# Patient Record
Sex: MALE | Race: OTHER
[De-identification: names, ages, dates, MRNs, and addresses within clinical notes are randomized per-mention and may not be internally consistent; named-entity substitution may affect disease eponyms.]

---

## 2019-09-13 ENCOUNTER — HOSPITAL ENCOUNTER (EMERGENCY)
Dept: HOSPITAL 1 - ED | Age: 79
Discharge: HOME | End: 2019-09-13
Payer: COMMERCIAL

## 2019-09-13 VITALS
BODY MASS INDEX: 28.32 KG/M2 | HEIGHT: 65 IN | HEIGHT: 65 IN | WEIGHT: 170 LBS | BODY MASS INDEX: 28.32 KG/M2 | WEIGHT: 170 LBS

## 2019-09-13 VITALS — DIASTOLIC BLOOD PRESSURE: 47 MMHG | SYSTOLIC BLOOD PRESSURE: 119 MMHG

## 2019-09-13 DIAGNOSIS — D72.829: ICD-10-CM

## 2019-09-13 DIAGNOSIS — D64.9: Primary | ICD-10-CM

## 2019-09-13 DIAGNOSIS — R63.4: ICD-10-CM

## 2019-09-13 DIAGNOSIS — E88.09: ICD-10-CM

## 2019-09-13 LAB
ALBUMIN SERPL-MCNC: 1.6 G/DL (ref 3.4–5)
ALP SERPL-CCNC: 235 U/L (ref 46–116)
ALT SERPL-CCNC: 63 U/L (ref 16–63)
AMPHETAMINES UR QL SCN: (no result)
AST SERPL-CCNC: 53 U/L (ref 15–37)
BASOPHILS NFR BLD: 0.3 % (ref 0–2)
BILIRUB SERPL-MCNC: 0.9 MG/DL (ref 0.2–1)
BUN SERPL-MCNC: 13 MG/DL (ref 7–18)
CALCIUM SERPL-MCNC: 8 MG/DL (ref 8.5–10.1)
CHLORIDE SERPL-SCNC: 101 MMOL/L (ref 98–107)
CHOLEST SERPL-MCNC: 73 MG/DL (ref ?–200)
CO2 SERPL-SCNC: 26.2 MMOL/L (ref 21–32)
CREAT SERPL-MCNC: 0.9 MG/DL (ref 0.7–1.3)
ERYTHROCYTE [DISTWIDTH] IN BLOOD BY AUTOMATED COUNT: 21.3 % (ref 11.5–14.5)
GFR SERPLBLD BASED ON 1.73 SQ M-ARVRAT: (no result) ML/MIN
GLUCOSE SERPL-MCNC: 92 MG/DL (ref 74–106)
HDLC SERPL-MCNC: 26 MG/DL (ref 40–60)
LIPASE SERPL-CCNC: 211 IU/L (ref 73–393)
MAGNESIUM SERPL-MCNC: 2.2 MG/DL (ref 1.8–2.4)
MICROSCOPIC UR-IMP: YES
PLATELET # BLD: 373 X10^3MCL (ref 130–400)
POTASSIUM SERPL-SCNC: 3.4 MMOL/L (ref 3.5–5.1)
PROT SERPL-MCNC: 7.4 G/DL (ref 6.4–8.2)
RBC # UR STRIP.AUTO: NEGATIVE /UL
RBC MORPH BLD: (no result)
SODIUM SERPL-SCNC: 135 MMOL/L (ref 136–145)
T4 SERPL-MCNC: 5 UG/DL (ref 4.7–13.3)
UA SPECIFIC GRAVITY: 1.02 (ref 1–1.03)

## 2019-09-13 PROCEDURE — G0480 DRUG TEST DEF 1-7 CLASSES: HCPCS

## 2019-10-01 ENCOUNTER — HOSPITAL ENCOUNTER (INPATIENT)
Dept: HOSPITAL 1 - ED | Age: 79
LOS: 3 days | Discharge: NURSING, REHAB OR LONG-TERM CARE | DRG: 391 | End: 2019-10-04
Attending: INTERNAL MEDICINE | Admitting: INTERNAL MEDICINE
Payer: COMMERCIAL

## 2019-10-01 VITALS
BODY MASS INDEX: 23.67 KG/M2 | BODY MASS INDEX: 23.67 KG/M2 | HEIGHT: 68 IN | WEIGHT: 156.19 LBS | HEIGHT: 68 IN | WEIGHT: 156.19 LBS

## 2019-10-01 VITALS — DIASTOLIC BLOOD PRESSURE: 43 MMHG | SYSTOLIC BLOOD PRESSURE: 117 MMHG

## 2019-10-01 VITALS — DIASTOLIC BLOOD PRESSURE: 51 MMHG | SYSTOLIC BLOOD PRESSURE: 120 MMHG

## 2019-10-01 DIAGNOSIS — E43: ICD-10-CM

## 2019-10-01 DIAGNOSIS — R19.03: Primary | ICD-10-CM

## 2019-10-01 DIAGNOSIS — R59.0: ICD-10-CM

## 2019-10-01 DIAGNOSIS — D72.829: ICD-10-CM

## 2019-10-01 DIAGNOSIS — F03.90: ICD-10-CM

## 2019-10-01 LAB
ALBUMIN SERPL-MCNC: 1.4 G/DL (ref 3.4–5)
ALP SERPL-CCNC: 228 U/L (ref 46–116)
ALT SERPL-CCNC: 47 U/L (ref 16–63)
AST SERPL-CCNC: 40 U/L (ref 15–37)
BASOPHILS NFR BLD: 0 % (ref 0–2)
BILIRUB SERPL-MCNC: 1 MG/DL (ref 0.2–1)
BUN SERPL-MCNC: 13 MG/DL (ref 7–18)
CALCIUM SERPL-MCNC: 8.2 MG/DL (ref 8.5–10.1)
CHLORIDE SERPL-SCNC: 98 MMOL/L (ref 98–107)
CO2 SERPL-SCNC: 27.4 MMOL/L (ref 21–32)
CREAT SERPL-MCNC: 1.1 MG/DL (ref 0.7–1.3)
ERYTHROCYTE [DISTWIDTH] IN BLOOD BY AUTOMATED COUNT: 21.6 % (ref 11.5–14.5)
GFR SERPLBLD BASED ON 1.73 SQ M-ARVRAT: (no result) ML/MIN
GLUCOSE SERPL-MCNC: 137 MG/DL (ref 74–106)
MICROSCOPIC UR-IMP: YES
MONOCYTES NFR BLD: 3 % (ref 0–7)
NEUTS BAND NFR BLD: 0 % (ref 0–10)
NEUTS SEG NFR BLD MANUAL: 96 % (ref 37–75)
PLAT MORPH BLD: (no result)
PLATELET # BLD: 460 X10^3MCL (ref 130–400)
POTASSIUM SERPL-SCNC: 3.8 MMOL/L (ref 3.5–5.1)
PROT SERPL-MCNC: 7.3 G/DL (ref 6.4–8.2)
RBC # UR STRIP.AUTO: (no result) /UL
RBC MORPH BLD: (no result)
SODIUM SERPL-SCNC: 133 MMOL/L (ref 136–145)
UA SPECIFIC GRAVITY: 1.01 (ref 1–1.03)

## 2019-10-01 PROCEDURE — G0378 HOSPITAL OBSERVATION PER HR: HCPCS

## 2019-10-01 PROCEDURE — G0480 DRUG TEST DEF 1-7 CLASSES: HCPCS

## 2019-10-01 NOTE — NUR
CALLED AND SPOKE TO DR.THOMAS GARLAND(OBGYN) TO UP RE-GYN CONSULT, HE SAID TO
KEEP THE PT NPO FOR D&C, MADE HIM AWARE LAST MEAL WAS AT 10AM TODAY AS PER PT.
PT HAD COMPLETED 1 UNIT OF PRBC DOWN IN E.R. AND  MADE AWARE OF THIS AND
HE SAID HE WANT THE SECOND UNIT TO BE TRANSFUSE TO PT PRIOR TO D&C PLAN TODAY.
 SAYS TO NOTIFY HIM ONCE 2ND UNIT  OF PRBC IS COMPLETED.
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
WILL CONT TO MONITOR.

## 2019-10-01 NOTE — NUR
ORDER FROM DR. CHUNG TO APPLY CARDIAC MONITOR ON PATIENT. APPLIED TELE #5. NSR
WITH FIRST DEGREE AV BLOCK.

## 2019-10-01 NOTE — NUR
PATIENT RECEIVED BY [facility] FOR GENERALIZED WEAKNESS. PT AAOX1, AWAKE BUT CONFUSED.
BREATHING E/U, PT APPEARS DISCHEVELED, PT INCONTINENT, PANTS SOAKED WITH URINE.
EKG GIVEN TO DR. WOOTEN. PTS CLOTHING REMOVED AND PLACED IN GOWN. PT PLACED ON
ALL MONITORS FOR FURTHER OBSERVATION. WILL CONTINUE TO MONITOR.

## 2019-10-01 NOTE — NUR
PATIENT RECEIVED IN BED EARLIER VIA BEDSIDE HANDS OFF AND INTRODUCTION,
PATIENT SEEMS COMFORTABLE, FAMILY AT BS VISITING, German SPEAKING FAMILY ABLE
TO HELP WITH INTERPRETATION, SPEECH IS CLEAR. PATIENT IS ORIENTED 2, FORGETFUL
AND CONFUSED AND DISORIENTED TO PLACE, RE-ORIENTED. IV SITE TO LFA PATENT AND
INTACT SITE NO SIGN OF REDNESS NOR INFILTTRATION. SAMSON CATHETER DRAINING
YELLOW UA, STAT LOCK INTACT TO LEFT ANTERIOR THIGH, NO DEPENDENT LOOPS AND
SAMSON BAG OFF THE FLOOR. SAFETY/FALL PRECAUTIONS MAINTAINED. BED ALARM ON,
PATIENT INSTRUCTED AND ADVICED TO CALL NURSE WHEN NEEDS ARISES, CALL LIGHT
PLACED IN REACH. WILL CONTINUE TO MONITOR.

## 2019-10-01 NOTE — NUR
FLU AND PNA VACCINE ADMINISTERED AS ORDERED. PT TOLERATED WELL. PNA
ADMINISTERED ON RIGHT ARM. FLU ON THE LEFT ARM. PT STABLE AT THIS TIME. WILL
CONTINUE TO MONITOR AND ENDORSE CARE TO NIGHT SHIFT.

## 2019-10-01 NOTE — NUR
ASSUMED CARE OF PATIENT. PT AWAKE, ALERT CALM AND COOPERATIVE. NO ACUTE
DISTRESS OR DISCOMFORT NOTED AT THIS TIME. SAFETY MEASURES IN PLACE, BED LOW
AND LOCKED. CALL LIGHT WITHIN REACH.

## 2019-10-01 NOTE — NUR
RECEIVED PT FROM ED VIA [facility], CAME IN DUE TO WEAKNESS. AAOX2 (PERSON, PLACE
AND BIRTHDATE). ABLE TO FOLLOW SIMPLE COMMANDS. NO FACIAL DROOP/ARM DRIFT
NOTED. LUNG SOUNDS DIMINISHED ON THE BASES, O2 SAT=95% ON 2LPM/NC. DENIES
CHEST PAIN/PRESSURE. DENIES ABDOMINAL DISCOMFORT. W/ Citizen of the [place] 15 SAMSON CATHETER
DRAINING W/ DARK YELLOW COLORED URINE. PT C/O PENILE DISCOMFORT DUE TO SAMSON
CATHETER. W/ DISCOLORATIONS ON THE BILATERAL KNEES, CARINE. GENERALIZED WEAKNESS.
IV SITE ON THE LEFT WRIST GAUGE 20, PATENT AND INTACT. RECEIVED PT FROM ED W/
NS AND VANCOMYCIN ONGOING. PT'S FRIEND NORA AT BEDSIDE. SIDE RAILS UPX2. CALL
LIGHT ON REACH. HOB ELEVATED AT 30 DEG. BED ALARM ON. ENDORSED TO PRIMARY
NURSE ELANA FOR CONTINUITY OF CARE

## 2019-10-01 NOTE — NUR
PATIENT RESTING ON GURNEY IN A POSITION OF COMFORT. NO SS OF DISTRESS NOTED.
WILL CONTINUE TO MONITOR.

## 2019-10-01 NOTE — NUR
REPORT PROVIDED TO ELIANA BERNABE FOR CONTINUED CARE OF PATIENT. VSS PRIOR TO
TRANSFER. TRANSFER ACCOMPANIED BY ELIANA HERNADEZ AND TECH.

## 2019-10-02 VITALS — SYSTOLIC BLOOD PRESSURE: 105 MMHG | DIASTOLIC BLOOD PRESSURE: 47 MMHG

## 2019-10-02 VITALS — DIASTOLIC BLOOD PRESSURE: 45 MMHG | SYSTOLIC BLOOD PRESSURE: 100 MMHG

## 2019-10-02 VITALS — SYSTOLIC BLOOD PRESSURE: 109 MMHG | DIASTOLIC BLOOD PRESSURE: 50 MMHG

## 2019-10-02 VITALS — SYSTOLIC BLOOD PRESSURE: 116 MMHG | DIASTOLIC BLOOD PRESSURE: 58 MMHG

## 2019-10-02 VITALS — DIASTOLIC BLOOD PRESSURE: 54 MMHG | SYSTOLIC BLOOD PRESSURE: 104 MMHG

## 2019-10-02 LAB
BUN SERPL-MCNC: 9 MG/DL (ref 7–18)
CALCIUM SERPL-MCNC: 7.6 MG/DL (ref 8.5–10.1)
CHLORIDE SERPL-SCNC: 103 MMOL/L (ref 98–107)
CO2 SERPL-SCNC: 25.4 MMOL/L (ref 21–32)
CREAT SERPL-MCNC: 0.9 MG/DL (ref 0.7–1.3)
ERYTHROCYTE [DISTWIDTH] IN BLOOD BY AUTOMATED COUNT: 21.2 % (ref 11.5–14.5)
GFR SERPLBLD BASED ON 1.73 SQ M-ARVRAT: (no result) ML/MIN
GLUCOSE SERPL-MCNC: 118 MG/DL (ref 74–106)
METAMYELOCYTES NFR BLD: 2 % (ref 0–2)
MONOCYTES NFR BLD: 3 % (ref 0–7)
MYELOCYTES NFR BLD: 1 % (ref 0–2)
NEUTS BAND NFR BLD: 4 % (ref 0–10)
NEUTS SEG NFR BLD MANUAL: 87 % (ref 37–75)
PLAT MORPH BLD: (no result)
PLATELET # BLD: 407 X10^3MCL (ref 130–400)
POTASSIUM SERPL-SCNC: 3.4 MMOL/L (ref 3.5–5.1)
RBC MORPH BLD: (no result)
SODIUM SERPL-SCNC: 135 MMOL/L (ref 136–145)

## 2019-10-02 NOTE — NUR
PT IS CONFUSED AND TRY TO PULL OUT IV AND SAMSON AND ALSO TRYING TO GET OUT OF
BED. TALK TO THE PT AND EMOTIONAL SUPPORT GIVEN. BED ALARM ON. PT IS GETTING
CALM AFTER TALKING TO HIM AND STAYING IN THE BED. CLOSELY MONITERING THE PT.
DENIES ANY PAIN, STABLE.

## 2019-10-02 NOTE — NUR
RECEIVED PT IN BED. ASSESSED AND DOCUMENTED. DENIES ANY PAIN. NO SOB NOTED
THIS TIME. SAFTEY PRECAUTIONS ARE IN PLACE. WILL MONITOR.

## 2019-10-02 NOTE — NUR
RESUMED CARE FROM SHALONDA MONROY. PT IS SLEEPING BUT EASILY AROUSABLE WHEN SPOKEN
TO. BREATHING IS EVEN AND UNLABORED. NO SIGNS OF RESP DISTRESS. DENIES PAIN AT
THIS TIME. WILL CONTINUE TO MONITOR. BED IN LOWEST POSITION. BED ALARM ON.
CALL LIGHT WITHIN REACH.

## 2019-10-02 NOTE — NUR
RECEIVED REPORT FROM DAY SHIFT RN. PT RESTING IN BED. AA&O X2 (PERSON/PLACE).
NO SOB ON O2 2L VIA NC. BREATHING EVEN AND UNLABORED. NO C/O PAIN. NO DISTRESS
NOTED. IV TO LEFT WRIST, D5NS INFUSING. SAFETY MEASURES IN PLACE. BED IN
LOWEST POSITION. SIDE RAILS UP X2. DEMONSTRATED HOW TO USE THE CALL LIGHT.
FRIENDS AT BEDSIDE.

## 2019-10-02 NOTE — NUR
PT SLEPT IN INTERVALS THROUGHOUT SHIFT WITH NO ACUTE EVENTS OVERNIGHT. COMFORT
AND SAFETY MEASURES MAINTAINED. ALL NEEDS ASSESSED AND ATTENDED TO. ENDORSED
CARE TO DAY SHIFT NURSE, IGNACIO MONROY.

## 2019-10-02 NOTE — NUR
PT'S FRIEND( PERSON FROM HIS [employer]) CAME TO VISIT HIM BUT HE DOESNOT KNOW
ANYTHING ABOUT HIS FAMILY AND HE DOESNOT HAVE ANY LEGAL RIGHT REGARDING HIM.
OR NURSE [place] AND  AWARE ABOUT THAT.

## 2019-10-02 NOTE — NUR
PT'S WIFE SAID THEY ARE STILL LEGALLY  BUT THEY ARE , NOT LIVE
TOGETHER, PT LIVES ALONE. SHE SAID SHE STILL HAVE THE LEGAL RIGHT OF TAKING
DECISION ABOUT HIM AND SIGNING THE CONSENT. PT STILL CONFUSED.

## 2019-10-02 NOTE — NUR
TRIED TO CALL PT'S WIFE MULTIPLE TIME FOR CONSENT BUT NOT ANSWERING, TRIED
PT'S DAUGHTER ALSO MULTIPLE TIME BUT GETTING. THEN TRIED TO CALL HIS FRIEND
BUT NOT ANSWERING. CALLED OR NURSE [place] AND INFORMED HIM ABOUT THAT AND
GAVE REPORT ABOUT PT.  ABOUT THE SITUATIONS. INFORED OR
NURSE [place] ABOUT NO PT/PTT ORDERED, HE SAID HE WILL ASK  AND IF
NEED IT HE WILL ORDER. ILANA WIPE DONE. PT IS CONFUSED AND DISORIENTED.

## 2019-10-02 NOTE — NUR
PT'S WIFE  CAME TO SAW THE PT. SIGNED SURGERY CONSENT. CALLED AND
INFORMED OR NURSE [place] ABOUT CONSENT. HE SAID HE WILL CONTACT 
AND CALL BACK. PT REMAINS CONFUSED BUT STABLE.

## 2019-10-03 VITALS — DIASTOLIC BLOOD PRESSURE: 60 MMHG | SYSTOLIC BLOOD PRESSURE: 107 MMHG

## 2019-10-03 VITALS — SYSTOLIC BLOOD PRESSURE: 114 MMHG | DIASTOLIC BLOOD PRESSURE: 59 MMHG

## 2019-10-03 VITALS — DIASTOLIC BLOOD PRESSURE: 52 MMHG | SYSTOLIC BLOOD PRESSURE: 109 MMHG

## 2019-10-03 VITALS — DIASTOLIC BLOOD PRESSURE: 59 MMHG | SYSTOLIC BLOOD PRESSURE: 115 MMHG

## 2019-10-03 VITALS — DIASTOLIC BLOOD PRESSURE: 54 MMHG | SYSTOLIC BLOOD PRESSURE: 111 MMHG

## 2019-10-03 LAB
BASOPHILS NFR BLD: 0 % (ref 0–2)
BUN SERPL-MCNC: 8 MG/DL (ref 7–18)
CALCIUM SERPL-MCNC: 7.3 MG/DL (ref 8.5–10.1)
CHLORIDE SERPL-SCNC: 103 MMOL/L (ref 98–107)
CO2 SERPL-SCNC: 25.3 MMOL/L (ref 21–32)
CREAT SERPL-MCNC: 0.8 MG/DL (ref 0.7–1.3)
DACRYOCYTES BLD QL SMEAR: (no result)
ERYTHROCYTE [DISTWIDTH] IN BLOOD BY AUTOMATED COUNT: 21.7 % (ref 11.5–14.5)
GFR SERPLBLD BASED ON 1.73 SQ M-ARVRAT: (no result) ML/MIN
GLUCOSE SERPL-MCNC: 102 MG/DL (ref 74–106)
METAMYELOCYTES NFR BLD: 1 % (ref 0–2)
MONOCYTES NFR BLD: 4 % (ref 0–7)
NEUTS BAND NFR BLD: 9 % (ref 0–10)
NEUTS SEG NFR BLD MANUAL: 81 % (ref 37–75)
PLAT MORPH BLD: (no result)
PLATELET # BLD: 398 X10^3MCL (ref 130–400)
POTASSIUM SERPL-SCNC: 3.6 MMOL/L (ref 3.5–5.1)
RBC MORPH BLD: (no result)
SODIUM SERPL-SCNC: 136 MMOL/L (ref 136–145)

## 2019-10-03 NOTE — NUR
BIOPSY RT INGUINAL MASS PROCEDURE WAS CANCELLED BY SURGEON. INFORMED PT AND
FAMILY.  AWARE. PROVIDED LUNCH AND ATE. STABLE. DENIES ANY PAIN.
SAMSON CATH REMOVED AS PER ORDER. PT COOPERATED WELL.

## 2019-10-03 NOTE — NUR
RECEIVED PT IN BED. ASSESSED AND DOCUMENTED. DENIES ANY PAIN. PT IS CONFUSED
AND TRY TO GET OUT OF BED AND PULL OUT HIS IV. EMOTIONAL SUPPORT GIVEN, AFTER
THAT PT CALM DOWN AND STAYING IN THE BED. WILL REINSERT IV SOON. SAFTEY
PRECAUTIONS ARE IN PLACE. BED ALARM ON. WILL MONITOR.

## 2019-10-03 NOTE — NUR
RECEIVED REPORT FROM DAY SHIFT RN. PT RESTING IN BED. AA&O X2, PERSON/PLACE.
REORIENTED TO TIME. BREATHING EVEN AND UNLABORED ON ROOM AIR. NO C/O PAIN. IV
TO LEFT HAND, D5NS INFUSING. SAFETY MEASURES IN PLACE. BED IN LOWEST POSITION.
SIDE RAILS UP X2. INSTRUCTED PT TO CALL IF HE NEEDS TO GET OUT OF BED.
DEMONSTRATED HOW TO USE THE CALL LIGHT FOR ASSISTANCE. CALL LIGHT WITHIN
REACH.

## 2019-10-03 NOTE — NUR
PT RESTED IN INTERVALS DURING SHIFT. NO SOB ON O2 2L VIA NC. NO C/O PAIN.
FREQUENT REMINDER TO KEEP NASAL CANNULA ON. IV TO LEFT WRIST PATENT AND
INTACT. NPO SINCE MIDNIGHT FOR RIGHT GROIN MASS BIOPSY TODAY. SIGNED CONSENT
IN THE CHART. CHECKLIST STARTED. SAFETY MEASURES MAINTAINED. ALL NEEDS
ATTENDED TO. CALL LIGHT WITHIN REACH. WILL CONTINUE TO MONITOR AND ENDORSE
CONTINUITY OF CARE TO ONCOMING RN.

## 2019-10-03 NOTE — NUR
PT RESTING IN BED COMFORTABLY. INSERTED NEW IV TO LH WITH GOOD BLOOD RETURN.
STABLE. DENIES ANY PAIN.

## 2019-10-04 VITALS — DIASTOLIC BLOOD PRESSURE: 53 MMHG | SYSTOLIC BLOOD PRESSURE: 111 MMHG

## 2019-10-04 VITALS — SYSTOLIC BLOOD PRESSURE: 126 MMHG | DIASTOLIC BLOOD PRESSURE: 52 MMHG

## 2019-10-04 VITALS — SYSTOLIC BLOOD PRESSURE: 107 MMHG | DIASTOLIC BLOOD PRESSURE: 48 MMHG

## 2019-10-04 VITALS — DIASTOLIC BLOOD PRESSURE: 47 MMHG | SYSTOLIC BLOOD PRESSURE: 124 MMHG

## 2019-10-04 LAB
BASOPHILS NFR BLD: 0 % (ref 0–2)
BUN SERPL-MCNC: 9 MG/DL (ref 7–18)
CALCIUM SERPL-MCNC: 7.4 MG/DL (ref 8.5–10.1)
CHLORIDE SERPL-SCNC: 103 MMOL/L (ref 98–107)
CO2 SERPL-SCNC: 24.7 MMOL/L (ref 21–32)
CREAT SERPL-MCNC: 0.8 MG/DL (ref 0.7–1.3)
ERYTHROCYTE [DISTWIDTH] IN BLOOD BY AUTOMATED COUNT: 21.3 % (ref 11.5–14.5)
GFR SERPLBLD BASED ON 1.73 SQ M-ARVRAT: (no result) ML/MIN
GLUCOSE SERPL-MCNC: 84 MG/DL (ref 74–106)
MONOCYTES NFR BLD: 2 % (ref 0–7)
NEUTS BAND NFR BLD: 0 % (ref 0–10)
NEUTS SEG NFR BLD MANUAL: 98 % (ref 37–75)
PLAT MORPH BLD: (no result)
PLATELET # BLD: 335 X10^3MCL (ref 130–400)
POTASSIUM SERPL-SCNC: 3.4 MMOL/L (ref 3.5–5.1)
RBC MORPH BLD: (no result)
SODIUM SERPL-SCNC: 137 MMOL/L (ref 136–145)

## 2019-10-04 NOTE — NUR
GAVE REPORT TO NIGHT NURSE PATIENT READY FOR DISCHARGE, ENDORSED NEED TO HAVE
WIFE SIGN TRANSFER CONSENT

## 2019-10-04 NOTE — NUR
RECEIVED HAND OFF REPORT FROM NIGHT NURSE. PATIENT SITTING UP IN BED AT THIS
TIME USING URINAL. PATIETN A/OX2 (PERSON, PLACE) BUT MEMORY SEEMS POOR.
Faroese SPEAKING MAINLY. FOLLOWING BASIC COMMANDS, SPEECH CLEAR. DENIES CHEAT
PAIN AT THIS TIME. LUNGS ARE DEMINISHED IN BASES ON 2L O2 VIA NC. GENERALIZED
WEAKNESS, PENDING EVAL. NO WOUNDS ON SKIN, DENEIS PAIN. IV TO LEFT WRIST
INFUSING WELL WITH D5NS.
PATIENT HAD SURGERY TO BIOPSY MASS IN RIGHT GROIN BUT CANCELLED DUE TO REPORT
THAT PATIENT CAN FOLLOW UP OUTPATIENT. CALL LIGHT WITHIN REACH, BED IN LOWEST
POSIITON WITH BED ALARM ON

## 2019-10-04 NOTE — NUR
DR CHUNG STATED PATIENT IS CLEAR TO BE DISCHARGED. CASE MANAGEMENT FOUND A SNF
TO TAKE PATIENT, TRANSFER IS ON WILL CALL. CASE MANAGEMENT IS CALLING WIFE TO
INFORM HER. WILL PREPARE PATIENT FOR DISCHARGE AND TRANSFER.

## 2019-10-04 NOTE — NUR
PATIENT WIFE AND FRIEND AT BEDSIDE. UPDATED FAMILY ON PATIENT CONDITION.
INFORMED THEM THAT SURGERY WAS NOT DONE AND CAN BE DONE OUTPATIENT. PENDING
SNF PLACEMENT. WILL HAVE TO FOLLOW UP WITH DR CHUNG WHEN HE ARRIVES.

## 2019-10-04 NOTE — NUR
PT RESTED IN INTERVALS THROUGHOUT SHIFT. NO SOB ON O2 2L VIA NC. BREATHING
EVEN AND UNLABORED. PT REMAINS FORGETFUL. NEED TO REMIND PT FREQUENTLY
TO USE THE CALL LIGHT FOR ASSISTANCE WHEN HE WANTS TO GET OUT OF BED. SAFETY
MEASURES MAINTAINED. BED IN LOWEST POSITION. SIDE RAILS UP X2. CALL LIGHT
WITHIN REACH. BED ALARM ON. WILL CONTINUE TO MONITOR AND ENDORSE CONTINUITY OF
CARE TO ONCOMING RN.

## 2019-10-04 NOTE — NUR
ASSISTED PATIENT TO RESTROOM, GAIT STEADY FOR THE SHORT DISTANCE, ARTHUR TIERNEY SAYS
THAT EVEAL WAS DISCUSSED BUT HAS NOT BEEN ENTERED IN CHART. WILL FOLLOW UP
WITH [facility]  CALL LIGHT WITHIN REACH OF PATIENT, BED ALARM ON

## 2019-10-04 NOTE — NUR
CALLED REPORT TO ALEISHA AT [facility]. ANSWERED ALL QUESTIONS AND
PROVIDED AUTHORIZATION NUMBER.  SHCEDULED FOR 8PM WITH MITZY

## 2022-08-27 NOTE — NUR
Acute exacerbation.  Exacerbation likely secondary to COVID infection  IV Solu-Medrol  Bronchodilators-DuoNebs, symbicort  Oxygen to maintain a sat greater than 90%  Pulmonology following   ENDORSED CONTINUITY OF CARE TO SHALONDA MONROY.